# Patient Record
Sex: FEMALE | Race: WHITE | NOT HISPANIC OR LATINO | Employment: FULL TIME | URBAN - NONMETROPOLITAN AREA
[De-identification: names, ages, dates, MRNs, and addresses within clinical notes are randomized per-mention and may not be internally consistent; named-entity substitution may affect disease eponyms.]

---

## 2017-07-08 ENCOUNTER — OFFICE VISIT (OUTPATIENT)
Dept: RETAIL CLINIC | Facility: CLINIC | Age: 27
End: 2017-07-08

## 2017-07-08 VITALS
HEART RATE: 98 BPM | WEIGHT: 277.2 LBS | TEMPERATURE: 98.7 F | SYSTOLIC BLOOD PRESSURE: 144 MMHG | HEIGHT: 67 IN | RESPIRATION RATE: 16 BRPM | BODY MASS INDEX: 43.51 KG/M2 | OXYGEN SATURATION: 98 % | DIASTOLIC BLOOD PRESSURE: 90 MMHG

## 2017-07-08 DIAGNOSIS — J02.9 PHARYNGITIS, UNSPECIFIED ETIOLOGY: Primary | ICD-10-CM

## 2017-07-08 LAB
EXPIRATION DATE: NORMAL
INTERNAL CONTROL: NORMAL
Lab: NORMAL
S PYO AG THROAT QL: NEGATIVE

## 2017-07-08 PROCEDURE — 99213 OFFICE O/P EST LOW 20 MIN: CPT | Performed by: NURSE PRACTITIONER

## 2017-07-08 PROCEDURE — 87880 STREP A ASSAY W/OPTIC: CPT | Performed by: NURSE PRACTITIONER

## 2017-07-08 RX ORDER — AZITHROMYCIN 250 MG/1
TABLET, FILM COATED ORAL
Qty: 6 TABLET | Refills: 0 | Status: SHIPPED | OUTPATIENT
Start: 2017-07-08

## 2017-07-08 RX ORDER — NORETHINDRONE ACETATE AND ETHINYL ESTRADIOL AND FERROUS FUMARATE 5-7-9-7
KIT ORAL DAILY
COMMUNITY
End: 2017-07-08

## 2017-07-08 NOTE — PATIENT INSTRUCTIONS

## 2017-07-08 NOTE — PROGRESS NOTES
Subjective   Marine Mai is a 26 y.o. female.   Chief Complaint   Patient presents with   • Sore Throat      HPI Comments: Presents to the clinic for complaint of st for the last 4-5 days.  Reports initially fever and swollen tonsils.  Reports fever has seemed to resolve and swelling of tonsils has went down.  Treating herself symptomatically.  No specific ill contacts.  No n/v/d.      Sore Throat    This is a new problem. The current episode started in the past 7 days. The maximum temperature recorded prior to her arrival was 100.4 - 100.9 F. Associated symptoms include congestion, headaches and trouble swallowing. Pertinent negatives include no abdominal pain, coughing, diarrhea, drooling, ear discharge, ear pain, shortness of breath or vomiting. She has had no exposure to strep or mono.        The following portions of the patient's history were reviewed and updated as appropriate: allergies, current medications, past family history, past medical history, past social history, past surgical history and problem list.    Current Outpatient Prescriptions:   •  Norgestim-Eth Estrad Triphasic (TRI-SPRINTEC PO), Take  by mouth., Disp: , Rfl:   •  azithromycin (ZITHROMAX) 250 MG tablet, Take 2 tablets the first day, then 1 tablet daily for 4 days., Disp: 6 tablet, Rfl: 0    Review of Systems   Constitutional: Negative for activity change, appetite change, chills, fatigue and fever.   HENT: Positive for congestion, sore throat and trouble swallowing. Negative for drooling, ear discharge, ear pain, mouth sores, nosebleeds, postnasal drip, rhinorrhea and sneezing.    Eyes: Positive for redness. Negative for pain, discharge and visual disturbance.   Respiratory: Negative for cough, shortness of breath and wheezing.    Gastrointestinal: Negative for abdominal pain, constipation, diarrhea, nausea and vomiting.   Skin: Negative for rash and wound.   Neurological: Positive for headaches.     /90 (BP Location: Right arm,  "Patient Position: Sitting, Cuff Size: Large Adult)  Pulse 98  Temp 98.7 °F (37.1 °C) (Oral)   Resp 16  Ht 67\" (170.2 cm)  Wt 277 lb 3.2 oz (126 kg)  LMP 06/30/2017 (Approximate)  SpO2 98%  BMI 43.42 kg/m2    Objective   No Known Allergies    Physical Exam   Constitutional: Vital signs are normal. She appears well-developed and well-nourished.   HENT:   Head: Normocephalic.   Right Ear: Tympanic membrane normal.   Left Ear: Tympanic membrane normal.   Nose: Nose normal.   Mouth/Throat: Uvula is midline and mucous membranes are normal. Posterior oropharyngeal erythema present. Tonsils are 2+ on the right. Tonsils are 2+ on the left.   On her L tonsil she has a whitish area however appears to be more of a tonsillar crypt than exudate.  She has a similar area on R tonsil.     Eyes: Conjunctivae and EOM are normal. Pupils are equal, round, and reactive to light.   Neck: Normal range of motion. Neck supple. No thyromegaly present.   Cardiovascular: Normal rate and regular rhythm.    Pulmonary/Chest: Effort normal and breath sounds normal.   Abdominal: Soft. Normal appearance and bowel sounds are normal. She exhibits no mass. There is no hepatosplenomegaly. There is no tenderness.   Musculoskeletal: Normal range of motion.   Neurological: She is alert.   Skin: Skin is warm and dry.       Assessment/Plan   Marine was seen today for sore throat.    Diagnoses and all orders for this visit:    Pharyngitis, unspecified etiology  -     POC Rapid Strep A    Other orders  -     azithromycin (ZITHROMAX) 250 MG tablet; Take 2 tablets the first day, then 1 tablet daily for 4 days.        Component      Latest Ref Rng & Units 7/8/2017   Rapid Strep A Screen      Negative, VALID, INVALID, Not Performed Negative   Internal Control      Passed Passed   Lot Number       [id number]   Expiration Date       9/30/2018        due to persistent symptoms rx given.  continue symptomatic therapy, warm salt water gargles, and follow up with " pcp if not improving. Discussed possible tonsillar crypts and possible ENT evaluation if they continue to be problematic for her.  She expresses understanding.  Pt denies any QT prolongation and taken Zithromax previously without problems.